# Patient Record
Sex: MALE | URBAN - METROPOLITAN AREA
[De-identification: names, ages, dates, MRNs, and addresses within clinical notes are randomized per-mention and may not be internally consistent; named-entity substitution may affect disease eponyms.]

---

## 2023-06-27 ENCOUNTER — OFFICE VISIT (OUTPATIENT)
Dept: URBAN - METROPOLITAN AREA CLINIC 113 | Facility: CLINIC | Age: 48
End: 2023-06-27

## 2025-01-07 ENCOUNTER — DASHBOARD ENCOUNTERS (OUTPATIENT)
Age: 50
End: 2025-01-07

## 2025-01-07 ENCOUNTER — OFFICE VISIT (OUTPATIENT)
Dept: URBAN - METROPOLITAN AREA CLINIC 72 | Facility: CLINIC | Age: 50
End: 2025-01-07
Payer: COMMERCIAL

## 2025-01-07 ENCOUNTER — LAB OUTSIDE AN ENCOUNTER (OUTPATIENT)
Dept: URBAN - METROPOLITAN AREA CLINIC 72 | Facility: CLINIC | Age: 50
End: 2025-01-07

## 2025-01-07 VITALS
BODY MASS INDEX: 31.57 KG/M2 | HEIGHT: 74 IN | HEART RATE: 78 BPM | TEMPERATURE: 97.8 F | DIASTOLIC BLOOD PRESSURE: 70 MMHG | WEIGHT: 246 LBS | SYSTOLIC BLOOD PRESSURE: 120 MMHG

## 2025-01-07 DIAGNOSIS — Z12.11 SCREENING FOR MALIGNANT NEOPLASM OF COLON: ICD-10-CM

## 2025-01-07 DIAGNOSIS — K21.9 GERD WITHOUT ESOPHAGITIS: ICD-10-CM

## 2025-01-07 PROBLEM — 266435005: Status: ACTIVE | Noted: 2025-01-07

## 2025-01-07 PROBLEM — 305058001: Status: ACTIVE | Noted: 2025-01-07

## 2025-01-07 PROCEDURE — 99204 OFFICE O/P NEW MOD 45 MIN: CPT

## 2025-01-07 RX ORDER — FAMOTIDINE 40 MG/1
1 TABLET AT BEDTIME TABLET, FILM COATED ORAL ONCE A DAY
Qty: 90 TABLET | Refills: 3 | OUTPATIENT
Start: 2025-01-07

## 2025-01-07 NOTE — HPI-TODAY'S VISIT:
Patient is a 49-year-old male referred by Dr. Alan Arteaga for colonoscopy screening. No previous colonoscopy.  NO FH colon/GI cancer .  Patient is having BM's daily. Bleeding every once in a while in the bowel and when he wipes. Not necesarily after constipation. Denies sitting on the toilet for long periods of time.  GERD on occasion - more recently. Will eat TUMS before he goes to bed.

## 2025-03-14 ENCOUNTER — OFFICE VISIT (OUTPATIENT)
Dept: URBAN - METROPOLITAN AREA MEDICAL CENTER 40 | Facility: MEDICAL CENTER | Age: 50
End: 2025-03-14
Payer: COMMERCIAL

## 2025-03-14 DIAGNOSIS — D12.0 ADENOMA OF CECUM: ICD-10-CM

## 2025-03-14 DIAGNOSIS — D12.2 ADENOMA OF ASCENDING COLON: ICD-10-CM

## 2025-03-14 DIAGNOSIS — D12.3 ADENOMA OF TRANSVERSE COLON: ICD-10-CM

## 2025-03-14 DIAGNOSIS — Z12.11 COLON CANCER SCREENING: ICD-10-CM

## 2025-03-14 PROCEDURE — 45385 COLONOSCOPY W/LESION REMOVAL: CPT | Performed by: INTERNAL MEDICINE

## 2025-03-14 PROCEDURE — 45380 COLONOSCOPY AND BIOPSY: CPT | Performed by: INTERNAL MEDICINE

## 2025-03-14 RX ORDER — FAMOTIDINE 40 MG/1
1 TABLET AT BEDTIME TABLET, FILM COATED ORAL ONCE A DAY
Qty: 90 TABLET | Refills: 3 | Status: ACTIVE | COMMUNITY
Start: 2025-01-07

## 2025-04-03 PROBLEM — 428283002: Status: ACTIVE | Noted: 2025-04-03

## 2025-04-04 ENCOUNTER — OFFICE VISIT (OUTPATIENT)
Dept: URBAN - METROPOLITAN AREA CLINIC 72 | Facility: CLINIC | Age: 50
End: 2025-04-04
Payer: COMMERCIAL

## 2025-04-04 DIAGNOSIS — Z86.0101 PERSONAL HISTORY OF ADENOMATOUS AND SERRATED COLON POLYPS: ICD-10-CM

## 2025-04-04 DIAGNOSIS — K21.9 GERD WITHOUT ESOPHAGITIS: ICD-10-CM

## 2025-04-04 PROCEDURE — 99213 OFFICE O/P EST LOW 20 MIN: CPT | Performed by: INTERNAL MEDICINE

## 2025-04-04 RX ORDER — FAMOTIDINE 40 MG/1
1 TABLET AT BEDTIME TABLET, FILM COATED ORAL ONCE A DAY
Qty: 90 TABLET | Refills: 3 | Status: ACTIVE | COMMUNITY
Start: 2025-01-07

## 2025-04-04 NOTE — HPI-TODAY'S VISIT:
Patient is a 49-year-old male last seen in the office on 1/7/2025 for screening colonoscopy and GERD.  Patient had colonoscopy performed on 3/14/2025 and is here for his 2-week follow-up.  Patient was placed on famotidine 40 mg nightly at that visit.  Patient is here and states that he did well after his colonoscopy. He did have slight irritation after the procedure. BM's are back to normal - they are food dependent. Patient is taking the fmaotidine as needed- it helps.

## 2025-04-04 NOTE — EXAM-PHYSICAL EXAM
General- no acute distress, normal appearance Eyes- anicteric, no pallor HENT- normocephalic, atraumatic head Neck- no lymphadenopathy, symmetric Chest- non labored, equal chest rise Heart- regular rate Abdomen- soft, non tender, non distended, no organomegaly Skin- no rashes, no jaundice Neurologic- Alert and oriented x 3, answers questions appropriately Psychiatric- stable mood, appropriate affect

## 2025-04-04 NOTE — HPI-OTHER HISTORIES
Procedures: 3/14/2025-colonoscopy: 8 mm cecal polyp.  8 mm, 2 mm, 6 mm colon polyps in ascending colon.  3 mm transverse colon polyp.  Path: Cecal polyp was a sessile serrated adenoma.  Ascending colon polyps x 3 were tubular adenomas negative for high-grade dysplasia.  Transverse colon polyp was a tubular adenoma negative for high-grade dysplasia.  Recall 3 years.  Due 3/2028.